# Patient Record
Sex: FEMALE | NOT HISPANIC OR LATINO | URBAN - METROPOLITAN AREA
[De-identification: names, ages, dates, MRNs, and addresses within clinical notes are randomized per-mention and may not be internally consistent; named-entity substitution may affect disease eponyms.]

---

## 2017-08-16 ENCOUNTER — IMPORTED ENCOUNTER (OUTPATIENT)
Dept: URBAN - METROPOLITAN AREA CLINIC 31 | Facility: CLINIC | Age: 72
End: 2017-08-16

## 2017-08-16 PROBLEM — H40.003: Noted: 2017-08-16

## 2017-08-16 PROBLEM — H04.123: Noted: 2017-08-16

## 2017-08-16 PROBLEM — Z96.1: Noted: 2017-08-16

## 2017-08-16 PROCEDURE — 92133 CPTRZD OPH DX IMG PST SGM ON: CPT

## 2017-08-16 PROCEDURE — 92014 COMPRE OPH EXAM EST PT 1/>: CPT

## 2017-08-16 PROCEDURE — 92015 DETERMINE REFRACTIVE STATE: CPT

## 2017-08-16 NOTE — PATIENT DISCUSSION
1.  Glaucoma suspect OU -  Fam hx MOM. Ocular Hypertension? ??  LVF 8/16 normal.  OCT 8/16/17 normal.  RNFL  74/80. No signs of glaucomatous damage to the optic nerve based on todays examination and testing. Will continue to monitor. 2. Pseudophakia OU - IOLs stable. Rezoom OD capsule clear Restor OS capsule open. Monitor. 3. Dry Eye OU:  Continue current management with Artificial Tears. 4.  Cont with OTC readers. 5.   RTN 1 yr CE/VF

## 2017-09-25 ENCOUNTER — APPOINTMENT (RX ONLY)
Dept: URBAN - METROPOLITAN AREA CLINIC 125 | Facility: CLINIC | Age: 72
Setting detail: DERMATOLOGY
End: 2017-09-25

## 2017-09-25 DIAGNOSIS — D18.0 HEMANGIOMA: ICD-10-CM

## 2017-09-25 DIAGNOSIS — L57.0 ACTINIC KERATOSIS: ICD-10-CM

## 2017-09-25 DIAGNOSIS — L60.3 NAIL DYSTROPHY: ICD-10-CM

## 2017-09-25 DIAGNOSIS — L82.1 OTHER SEBORRHEIC KERATOSIS: ICD-10-CM

## 2017-09-25 PROBLEM — D18.01 HEMANGIOMA OF SKIN AND SUBCUTANEOUS TISSUE: Status: ACTIVE | Noted: 2017-09-25

## 2017-09-25 PROCEDURE — ? PRESCRIPTION

## 2017-09-25 PROCEDURE — 99214 OFFICE O/P EST MOD 30 MIN: CPT

## 2017-09-25 RX ORDER — TRIAMCINOLONE ACETONIDE 0.1 %
OINTMENT (GRAM) TOPICAL
Qty: 1 | Refills: 1 | Status: ERX

## 2017-09-25 RX ORDER — FLUOROURACIL 50 MG/G
CREAM TOPICAL
Qty: 1 | Refills: 0 | Status: ERX

## 2017-09-25 ASSESSMENT — LOCATION ZONE DERM
LOCATION ZONE: TRUNK
LOCATION ZONE: TOE
LOCATION ZONE: NOSE
LOCATION ZONE: TRUNK

## 2017-09-25 ASSESSMENT — LOCATION DETAILED DESCRIPTION DERM
LOCATION DETAILED: LEFT MEDIAL BREAST 8-9:00 REGION
LOCATION DETAILED: NASAL SUPRATIP
LOCATION DETAILED: RIGHT SUPERIOR UPPER BACK
LOCATION DETAILED: LEFT DISTAL PLANTAR GREAT TOE
LOCATION DETAILED: LEFT LATERAL ABDOMEN

## 2017-09-25 ASSESSMENT — LOCATION SIMPLE DESCRIPTION DERM
LOCATION SIMPLE: NOSE
LOCATION SIMPLE: LEFT BREAST
LOCATION SIMPLE: LEFT GREAT TOE
LOCATION SIMPLE: RIGHT UPPER BACK
LOCATION SIMPLE: ABDOMEN

## 2017-11-27 ENCOUNTER — APPOINTMENT (RX ONLY)
Dept: URBAN - METROPOLITAN AREA CLINIC 125 | Facility: CLINIC | Age: 72
Setting detail: DERMATOLOGY
End: 2017-11-27

## 2017-11-27 DIAGNOSIS — T07XXXA INSECT BITE, NONVENOMOUS, OF OTHER, MULTIPLE, AND UNSPECIFIED SITES, WITHOUT MENTION OF INFECTION: ICD-10-CM

## 2017-11-27 DIAGNOSIS — Z09 ENCOUNTER FOR FOLLOW-UP EXAMINATION AFTER COMPLETED TREATMENT FOR CONDITIONS OTHER THAN MALIGNANT NEOPLASM: ICD-10-CM | Status: RESOLVED

## 2017-11-27 PROBLEM — L30.9 DERMATITIS, UNSPECIFIED: Status: ACTIVE | Noted: 2017-11-27

## 2017-11-27 PROBLEM — D48.5 NEOPLASM OF UNCERTAIN BEHAVIOR OF SKIN: Status: ACTIVE | Noted: 2017-11-27

## 2017-11-27 PROCEDURE — ? TREATMENT REGIMEN

## 2017-11-27 PROCEDURE — 99213 OFFICE O/P EST LOW 20 MIN: CPT

## 2017-11-27 PROCEDURE — ? COUNSELING

## 2017-11-27 ASSESSMENT — LOCATION SIMPLE DESCRIPTION DERM
LOCATION SIMPLE: LEFT ANKLE
LOCATION SIMPLE: NOSE
LOCATION SIMPLE: LEFT CALF

## 2017-11-27 ASSESSMENT — LOCATION DETAILED DESCRIPTION DERM
LOCATION DETAILED: NASAL SUPRATIP
LOCATION DETAILED: LEFT DISTAL CALF
LOCATION DETAILED: LEFT POSTERIOR ANKLE

## 2017-11-27 ASSESSMENT — LOCATION ZONE DERM
LOCATION ZONE: LEG
LOCATION ZONE: NOSE

## 2017-11-27 NOTE — PROCEDURE: TREATMENT REGIMEN
Detail Level: Simple
Initiate Treatment: Pt has Triamcinolone cream at home and is advised to apply to aa on back of leg BID x2 weeks
Plan: Pt to RTC PRN if aa persists

## 2018-07-11 ENCOUNTER — APPOINTMENT (RX ONLY)
Dept: URBAN - METROPOLITAN AREA CLINIC 125 | Facility: CLINIC | Age: 73
Setting detail: DERMATOLOGY
End: 2018-07-11

## 2018-07-11 DIAGNOSIS — D485 NEOPLASM OF UNCERTAIN BEHAVIOR OF SKIN: ICD-10-CM

## 2018-07-11 DIAGNOSIS — L81.4 OTHER MELANIN HYPERPIGMENTATION: ICD-10-CM

## 2018-07-11 PROBLEM — D48.5 NEOPLASM OF UNCERTAIN BEHAVIOR OF SKIN: Status: ACTIVE | Noted: 2018-07-11

## 2018-07-11 PROCEDURE — ? COUNSELING

## 2018-07-11 PROCEDURE — 11100: CPT

## 2018-07-11 PROCEDURE — 99213 OFFICE O/P EST LOW 20 MIN: CPT | Mod: 25

## 2018-07-11 PROCEDURE — ? BIOPSY BY SHAVE METHOD

## 2018-07-11 ASSESSMENT — LOCATION ZONE DERM
LOCATION ZONE: EAR
LOCATION ZONE: FACE

## 2018-07-11 ASSESSMENT — LOCATION SIMPLE DESCRIPTION DERM
LOCATION SIMPLE: LEFT CHEEK
LOCATION SIMPLE: LEFT EAR

## 2018-07-11 ASSESSMENT — LOCATION DETAILED DESCRIPTION DERM
LOCATION DETAILED: LEFT INFERIOR CENTRAL MALAR CHEEK
LOCATION DETAILED: LEFT INFERIOR HELIX

## 2018-07-11 NOTE — PROCEDURE: BIOPSY BY SHAVE METHOD
Curettage Text: The wound bed was treated with curettage after the biopsy was performed.
Detail Level: Detailed
Electrodesiccation Text: The wound bed was treated with electrodesiccation after the biopsy was performed.
Biopsy Type: H and E
Render Post-Care Instructions In Note?: yes
Silver Nitrate Text: The wound bed was treated with silver nitrate after the biopsy was performed.
Lab: [facility] [place]
Post-Care Instructions: I reviewed with the patient in detail post-care instructions. Patient is to keep the biopsy site dry overnight, and then apply bacitracin twice daily until healed. Patient may apply hydrogen peroxide soaks to remove any crusting.
Billing Type: [employer]
Notification Instructions: Patient will be notified of biopsy results. However, patient instructed to call the office if not contacted within 2 weeks.
Additional Anesthesia Volume In Cc (Will Not Render If 0): 0
Lab Facility: [id number]
Type Of Destruction Used: Electrodesiccation and Curettage
Depth Of Biopsy: dermis
Electrodesiccation And Curettage Text: The wound bed was treated with electrodesiccation and curettage after the biopsy was performed.
Dressing: bandage
Destruction After The Procedure: No
Anesthesia Type: 1% lidocaine with epinephrine
Wound Care: Bacitracin
Cryotherapy Text: The wound bed was treated with cryotherapy after the biopsy was performed.
Hemostasis: Drysol
Anesthesia Volume In Cc (Will Not Render If 0): 0.5
Size Of Lesion In Cm: 0.4
Consent: Written consent was obtained and risks were reviewed including but not limited to scarring, infection, bleeding, scabbing, incomplete removal, nerve damage and allergy to anesthesia.
Biopsy Method: Dermablade

## 2018-08-15 ENCOUNTER — IMPORTED ENCOUNTER (OUTPATIENT)
Dept: URBAN - METROPOLITAN AREA CLINIC 31 | Facility: CLINIC | Age: 73
End: 2018-08-15

## 2018-08-15 PROBLEM — H04.123: Noted: 2018-08-15

## 2018-08-15 PROBLEM — H40.003: Noted: 2018-08-15

## 2018-08-15 PROBLEM — Z96.1: Noted: 2018-08-15

## 2018-08-15 PROCEDURE — 92014 COMPRE OPH EXAM EST PT 1/>: CPT

## 2018-08-15 PROCEDURE — 92015 DETERMINE REFRACTIVE STATE: CPT

## 2018-08-15 PROCEDURE — 92083 EXTENDED VISUAL FIELD XM: CPT

## 2018-08-15 NOTE — PATIENT DISCUSSION
1.  Glaucoma suspect OU -  Fam hx MOM. Ocular Hypertension? ??  LVF 8/18 normal.  OCT 8/16/17 normal.  RNFL  74/80. No signs of glaucomatous damage to the optic nerve based on todays examination and testing. Will continue to monitor. 2. Pseudophakia OU - IOLs stable. Rezoom OD capsule clear Restor OS capsule open. Monitor. 3. Dry Eye OU:  Continue current management with Artificial Tears. 4.  Cont with OTC readers. 5.   RTN 1 yr CE/OCT

## 2018-12-04 ENCOUNTER — APPOINTMENT (RX ONLY)
Dept: URBAN - METROPOLITAN AREA CLINIC 125 | Facility: CLINIC | Age: 73
Setting detail: DERMATOLOGY
End: 2018-12-04

## 2018-12-04 DIAGNOSIS — D18.0 HEMANGIOMA: ICD-10-CM

## 2018-12-04 DIAGNOSIS — L57.0 ACTINIC KERATOSIS: ICD-10-CM

## 2018-12-04 DIAGNOSIS — L82.1 OTHER SEBORRHEIC KERATOSIS: ICD-10-CM

## 2018-12-04 DIAGNOSIS — L81.4 OTHER MELANIN HYPERPIGMENTATION: ICD-10-CM

## 2018-12-04 DIAGNOSIS — D22 MELANOCYTIC NEVI: ICD-10-CM

## 2018-12-04 DIAGNOSIS — Z71.89 OTHER SPECIFIED COUNSELING: ICD-10-CM

## 2018-12-04 PROBLEM — D22.5 MELANOCYTIC NEVI OF TRUNK: Status: ACTIVE | Noted: 2018-12-04

## 2018-12-04 PROBLEM — D18.01 HEMANGIOMA OF SKIN AND SUBCUTANEOUS TISSUE: Status: ACTIVE | Noted: 2018-12-04

## 2018-12-04 PROCEDURE — 99214 OFFICE O/P EST MOD 30 MIN: CPT | Mod: 25

## 2018-12-04 PROCEDURE — 17000 DESTRUCT PREMALG LESION: CPT

## 2018-12-04 PROCEDURE — ? LIQUID NITROGEN

## 2018-12-04 PROCEDURE — ? COUNSELING

## 2018-12-04 PROCEDURE — 17003 DESTRUCT PREMALG LES 2-14: CPT

## 2018-12-04 ASSESSMENT — LOCATION SIMPLE DESCRIPTION DERM
LOCATION SIMPLE: LEFT UPPER BACK
LOCATION SIMPLE: UPPER BACK
LOCATION SIMPLE: ABDOMEN
LOCATION SIMPLE: CHEST
LOCATION SIMPLE: LEFT HAND
LOCATION SIMPLE: RIGHT FOREARM
LOCATION SIMPLE: RIGHT HAND

## 2018-12-04 ASSESSMENT — LOCATION DETAILED DESCRIPTION DERM
LOCATION DETAILED: INFERIOR THORACIC SPINE
LOCATION DETAILED: LEFT RADIAL DORSAL HAND
LOCATION DETAILED: RIGHT DISTAL DORSAL FOREARM
LOCATION DETAILED: MIDDLE STERNUM
LOCATION DETAILED: RIGHT ULNAR DORSAL HAND
LOCATION DETAILED: LEFT INFERIOR UPPER BACK
LOCATION DETAILED: EPIGASTRIC SKIN

## 2018-12-04 ASSESSMENT — LOCATION ZONE DERM
LOCATION ZONE: TRUNK
LOCATION ZONE: HAND
LOCATION ZONE: ARM

## 2018-12-04 NOTE — PROCEDURE: LIQUID NITROGEN
Detail Level: Simple
Number Of Freeze-Thaw Cycles: 2 freeze-thaw cycles
Consent: The patient's consent was obtained including but not limited to risks of crusting, scabbing, blistering, scarring, darker or lighter pigmentary change, recurrence, incomplete removal and infection.
Post-Care Instructions: I reviewed with the patient in detail post-care instructions. Patient is to wear sunprotection, and avoid picking at any of the treated lesions. Pt may apply Vaseline to crusted or scabbing areas.
Render Post-Care Instructions In Note?: no
Duration Of Freeze Thaw-Cycle (Seconds): 0

## 2019-01-08 ENCOUNTER — APPOINTMENT (RX ONLY)
Dept: URBAN - METROPOLITAN AREA CLINIC 125 | Facility: CLINIC | Age: 74
Setting detail: DERMATOLOGY
End: 2019-01-08

## 2019-01-08 DIAGNOSIS — L57.0 ACTINIC KERATOSIS: ICD-10-CM

## 2019-01-08 DIAGNOSIS — L81.4 OTHER MELANIN HYPERPIGMENTATION: ICD-10-CM

## 2019-01-08 PROCEDURE — ? LIQUID NITROGEN

## 2019-01-08 PROCEDURE — 17000 DESTRUCT PREMALG LESION: CPT

## 2019-01-08 PROCEDURE — ? COUNSELING

## 2019-01-08 PROCEDURE — 17003 DESTRUCT PREMALG LES 2-14: CPT

## 2019-01-08 PROCEDURE — 99213 OFFICE O/P EST LOW 20 MIN: CPT | Mod: 25

## 2019-01-08 ASSESSMENT — LOCATION SIMPLE DESCRIPTION DERM
LOCATION SIMPLE: RIGHT HAND
LOCATION SIMPLE: LEFT HAND
LOCATION SIMPLE: LEFT CHEEK

## 2019-01-08 ASSESSMENT — LOCATION DETAILED DESCRIPTION DERM
LOCATION DETAILED: LEFT INFERIOR CENTRAL MALAR CHEEK
LOCATION DETAILED: RIGHT RADIAL DORSAL HAND
LOCATION DETAILED: LEFT ULNAR DORSAL HAND

## 2019-01-08 ASSESSMENT — LOCATION ZONE DERM
LOCATION ZONE: HAND
LOCATION ZONE: FACE

## 2019-01-08 NOTE — PROCEDURE: LIQUID NITROGEN
Render Post-Care Instructions In Note?: no
Detail Level: Simple
Consent: The patient's consent was obtained including but not limited to risks of crusting, scabbing, blistering, scarring, darker or lighter pigmentary change, recurrence, incomplete removal and infection.
Number Of Freeze-Thaw Cycles: 2 freeze-thaw cycles
Post-Care Instructions: I reviewed with the patient in detail post-care instructions. Patient is to wear sunprotection, and avoid picking at any of the treated lesions. Pt may apply Vaseline to crusted or scabbing areas.
Duration Of Freeze Thaw-Cycle (Seconds): 0

## 2019-08-20 ENCOUNTER — APPOINTMENT (RX ONLY)
Dept: URBAN - METROPOLITAN AREA CLINIC 125 | Facility: CLINIC | Age: 74
Setting detail: DERMATOLOGY
End: 2019-08-20

## 2019-08-20 DIAGNOSIS — L57.0 ACTINIC KERATOSIS: ICD-10-CM

## 2019-08-20 DIAGNOSIS — D22 MELANOCYTIC NEVI: ICD-10-CM

## 2019-08-20 DIAGNOSIS — L81.4 OTHER MELANIN HYPERPIGMENTATION: ICD-10-CM

## 2019-08-20 DIAGNOSIS — D18.0 HEMANGIOMA: ICD-10-CM

## 2019-08-20 DIAGNOSIS — L82.1 OTHER SEBORRHEIC KERATOSIS: ICD-10-CM

## 2019-08-20 DIAGNOSIS — Z71.89 OTHER SPECIFIED COUNSELING: ICD-10-CM

## 2019-08-20 PROBLEM — D18.01 HEMANGIOMA OF SKIN AND SUBCUTANEOUS TISSUE: Status: ACTIVE | Noted: 2019-08-20

## 2019-08-20 PROBLEM — D22.5 MELANOCYTIC NEVI OF TRUNK: Status: ACTIVE | Noted: 2019-08-20

## 2019-08-20 PROCEDURE — ? LIQUID NITROGEN

## 2019-08-20 PROCEDURE — ? COUNSELING

## 2019-08-20 PROCEDURE — 99214 OFFICE O/P EST MOD 30 MIN: CPT | Mod: 25

## 2019-08-20 PROCEDURE — 17003 DESTRUCT PREMALG LES 2-14: CPT

## 2019-08-20 PROCEDURE — 17000 DESTRUCT PREMALG LESION: CPT

## 2019-08-20 ASSESSMENT — LOCATION ZONE DERM
LOCATION ZONE: TRUNK
LOCATION ZONE: ARM

## 2019-08-20 ASSESSMENT — LOCATION SIMPLE DESCRIPTION DERM
LOCATION SIMPLE: LEFT FOREARM
LOCATION SIMPLE: CHEST
LOCATION SIMPLE: LEFT UPPER BACK
LOCATION SIMPLE: UPPER BACK
LOCATION SIMPLE: ABDOMEN
LOCATION SIMPLE: RIGHT FOREARM

## 2019-08-20 ASSESSMENT — LOCATION DETAILED DESCRIPTION DERM
LOCATION DETAILED: EPIGASTRIC SKIN
LOCATION DETAILED: LEFT INFERIOR UPPER BACK
LOCATION DETAILED: RIGHT DISTAL DORSAL FOREARM
LOCATION DETAILED: MIDDLE STERNUM
LOCATION DETAILED: RIGHT PROXIMAL DORSAL FOREARM
LOCATION DETAILED: LEFT DISTAL DORSAL FOREARM
LOCATION DETAILED: LEFT PROXIMAL DORSAL FOREARM
LOCATION DETAILED: RIGHT PROXIMAL RADIAL DORSAL FOREARM
LOCATION DETAILED: INFERIOR THORACIC SPINE

## 2019-08-20 ASSESSMENT — PAIN INTENSITY VAS: HOW INTENSE IS YOUR PAIN 0 BEING NO PAIN, 10 BEING THE MOST SEVERE PAIN POSSIBLE?: NO PAIN

## 2019-08-26 ENCOUNTER — IMPORTED ENCOUNTER (OUTPATIENT)
Dept: URBAN - METROPOLITAN AREA CLINIC 31 | Facility: CLINIC | Age: 74
End: 2019-08-26

## 2019-08-26 PROBLEM — Z96.1: Noted: 2019-08-26

## 2019-08-26 PROBLEM — H40.003: Noted: 2019-08-26

## 2019-08-26 PROBLEM — H04.123: Noted: 2019-08-26

## 2019-08-26 PROCEDURE — 92014 COMPRE OPH EXAM EST PT 1/>: CPT

## 2019-08-26 PROCEDURE — 92015 DETERMINE REFRACTIVE STATE: CPT

## 2019-08-26 PROCEDURE — 92133 CPTRZD OPH DX IMG PST SGM ON: CPT

## 2019-08-26 NOTE — PATIENT DISCUSSION
1.  Glaucoma suspect OU -  Fam hx MOM. Ocular Hypertension? ??  LVF 8/18 normal.  OCT 8/26/19 normal.  RNFL  74/80. No signs of glaucomatous damage to the optic nerve based on todays examination and testing. Will continue to monitor. 2. Pseudophakia OU - IOLs stable. Rezoom OD capsule clear Restor OS capsule open. Monitor. 3. Dry Eye OU:  Continue current management with Artificial Tears. 4.  Cont with OTC readers. 5.   RTN 1 yr CE/VF

## 2020-08-26 ENCOUNTER — APPOINTMENT (RX ONLY)
Dept: URBAN - METROPOLITAN AREA CLINIC 126 | Facility: CLINIC | Age: 75
Setting detail: DERMATOLOGY
End: 2020-08-26

## 2020-08-26 DIAGNOSIS — L72.8 OTHER FOLLICULAR CYSTS OF THE SKIN AND SUBCUTANEOUS TISSUE: ICD-10-CM

## 2020-08-26 DIAGNOSIS — D18.0 HEMANGIOMA: ICD-10-CM

## 2020-08-26 DIAGNOSIS — L57.0 ACTINIC KERATOSIS: ICD-10-CM

## 2020-08-26 DIAGNOSIS — D22 MELANOCYTIC NEVI: ICD-10-CM

## 2020-08-26 DIAGNOSIS — Z71.89 OTHER SPECIFIED COUNSELING: ICD-10-CM

## 2020-08-26 DIAGNOSIS — L82.1 OTHER SEBORRHEIC KERATOSIS: ICD-10-CM

## 2020-08-26 DIAGNOSIS — L81.4 OTHER MELANIN HYPERPIGMENTATION: ICD-10-CM

## 2020-08-26 PROBLEM — D22.5 MELANOCYTIC NEVI OF TRUNK: Status: ACTIVE | Noted: 2020-08-26

## 2020-08-26 PROBLEM — D18.01 HEMANGIOMA OF SKIN AND SUBCUTANEOUS TISSUE: Status: ACTIVE | Noted: 2020-08-26

## 2020-08-26 PROCEDURE — ? COUNSELING

## 2020-08-26 PROCEDURE — 17000 DESTRUCT PREMALG LESION: CPT

## 2020-08-26 PROCEDURE — 99214 OFFICE O/P EST MOD 30 MIN: CPT | Mod: 25

## 2020-08-26 PROCEDURE — ? LIQUID NITROGEN

## 2020-08-26 PROCEDURE — 17003 DESTRUCT PREMALG LES 2-14: CPT

## 2020-08-26 ASSESSMENT — LOCATION DETAILED DESCRIPTION DERM
LOCATION DETAILED: MIDDLE STERNUM
LOCATION DETAILED: LEFT DISTAL DORSAL FOREARM
LOCATION DETAILED: EPIGASTRIC SKIN
LOCATION DETAILED: LEFT INFERIOR UPPER BACK
LOCATION DETAILED: LEFT SUPERIOR MEDIAL LOWER BACK
LOCATION DETAILED: INFERIOR THORACIC SPINE

## 2020-08-26 ASSESSMENT — LOCATION SIMPLE DESCRIPTION DERM
LOCATION SIMPLE: CHEST
LOCATION SIMPLE: LEFT LOWER BACK
LOCATION SIMPLE: LEFT UPPER BACK
LOCATION SIMPLE: UPPER BACK
LOCATION SIMPLE: ABDOMEN
LOCATION SIMPLE: LEFT FOREARM

## 2020-08-26 ASSESSMENT — LOCATION ZONE DERM
LOCATION ZONE: TRUNK
LOCATION ZONE: ARM

## 2020-08-26 NOTE — PROCEDURE: LIQUID NITROGEN
Consent: The patient's consent was obtained including but not limited to risks of crusting, scabbing, blistering, scarring, darker or lighter pigmentary change, recurrence, incomplete removal and infection.
Render Note In Bullet Format When Appropriate: No
Post-Care Instructions: I reviewed with the patient in detail post-care instructions. Patient is to wear sunprotection, and avoid picking at any of the treated lesions. Pt may apply Vaseline to crusted or scabbing areas.
Detail Level: Simple
Number Of Freeze-Thaw Cycles: 3 freeze-thaw cycles
Render Post-Care Instructions In Note?: yes
Duration Of Freeze Thaw-Cycle (Seconds): 5

## 2020-08-31 ENCOUNTER — IMPORTED ENCOUNTER (OUTPATIENT)
Dept: URBAN - METROPOLITAN AREA CLINIC 31 | Facility: CLINIC | Age: 75
End: 2020-08-31

## 2020-08-31 PROBLEM — H40.003: Noted: 2020-08-31

## 2020-08-31 PROBLEM — H04.123: Noted: 2020-08-31

## 2020-08-31 PROBLEM — H40.013: Noted: 2020-08-31

## 2020-08-31 PROBLEM — Z96.1: Noted: 2020-08-31

## 2020-08-31 PROCEDURE — 92250 FUNDUS PHOTOGRAPHY W/I&R: CPT

## 2020-08-31 PROCEDURE — 92083 EXTENDED VISUAL FIELD XM: CPT

## 2020-08-31 PROCEDURE — 92015 DETERMINE REFRACTIVE STATE: CPT

## 2020-08-31 PROCEDURE — 92014 COMPRE OPH EXAM EST PT 1/>: CPT

## 2020-08-31 NOTE — PATIENT DISCUSSION
1.  Glaucoma suspect OU -  Fam hx MOM. Ocular Hypertension? ??  LVF 8/20 normal.  OCT 8/26/19 normal.  RNFL  74/80. No signs of glaucomatous damage to the optic nerve based on todays examination and testing. Will continue to monitor. 2. Pseudophakia OU - IOLs stable. Rezoom OD capsule open Restor OS capsule open. Monitor. 3. Dry Eye OU:  Continue current management with Artificial Tears. bid or more  4. Cont with OTC readers. 5.   RTN 1 yr CE/ OCT Nerve and optos

## 2021-08-30 ENCOUNTER — IMPORTED ENCOUNTER (OUTPATIENT)
Dept: URBAN - METROPOLITAN AREA CLINIC 31 | Facility: CLINIC | Age: 76
End: 2021-08-30

## 2021-08-30 PROBLEM — Z96.1: Noted: 2021-08-30

## 2021-08-30 PROBLEM — H04.123: Noted: 2021-08-30

## 2021-08-30 PROBLEM — H40.003: Noted: 2021-08-30

## 2021-08-30 PROBLEM — H40.013: Noted: 2021-08-30

## 2021-08-30 PROCEDURE — 92250 FUNDUS PHOTOGRAPHY W/I&R: CPT

## 2021-08-30 PROCEDURE — 92133 CPTRZD OPH DX IMG PST SGM ON: CPT

## 2021-08-30 PROCEDURE — 92014 COMPRE OPH EXAM EST PT 1/>: CPT

## 2021-08-30 PROCEDURE — 92015 DETERMINE REFRACTIVE STATE: CPT

## 2021-08-30 NOTE — PATIENT DISCUSSION
1.  Glaucoma suspect OU -  Fam hx MOM. Ocular Hypertension? ??  LVF 8/20 normal.  OCT 8/30/21  normal.  RNFL  82/101 from 74/80. No signs of glaucomatous damage to the optic nerve based on todays examination and testing. Will continue to monitor. 2. Pseudophakia OU - IOLs stable. Rezoom/Restor. capsule open OU. Monitor. 3. Dry Eye OU:  Start using tears qid and gels qhs ou.    4.  Cont with OTC readers. 5.   RTN 1 yr CE/ HVF 24/-2 and optos

## 2021-09-01 ENCOUNTER — APPOINTMENT (RX ONLY)
Dept: URBAN - METROPOLITAN AREA CLINIC 126 | Facility: CLINIC | Age: 76
Setting detail: DERMATOLOGY
End: 2021-09-01

## 2021-09-01 DIAGNOSIS — D22 MELANOCYTIC NEVI: ICD-10-CM

## 2021-09-01 DIAGNOSIS — D18.0 HEMANGIOMA: ICD-10-CM

## 2021-09-01 DIAGNOSIS — Z71.89 OTHER SPECIFIED COUNSELING: ICD-10-CM

## 2021-09-01 DIAGNOSIS — L82.1 OTHER SEBORRHEIC KERATOSIS: ICD-10-CM

## 2021-09-01 DIAGNOSIS — L81.4 OTHER MELANIN HYPERPIGMENTATION: ICD-10-CM

## 2021-09-01 PROBLEM — D22.5 MELANOCYTIC NEVI OF TRUNK: Status: ACTIVE | Noted: 2021-09-01

## 2021-09-01 PROBLEM — D18.01 HEMANGIOMA OF SKIN AND SUBCUTANEOUS TISSUE: Status: ACTIVE | Noted: 2021-09-01

## 2021-09-01 PROCEDURE — ? SUNSCREEN RECOMMENDATIONS

## 2021-09-01 PROCEDURE — ? COUNSELING

## 2021-09-01 PROCEDURE — 99213 OFFICE O/P EST LOW 20 MIN: CPT

## 2021-09-01 ASSESSMENT — LOCATION DETAILED DESCRIPTION DERM
LOCATION DETAILED: EPIGASTRIC SKIN
LOCATION DETAILED: INFERIOR THORACIC SPINE
LOCATION DETAILED: LEFT INFERIOR UPPER BACK
LOCATION DETAILED: MIDDLE STERNUM

## 2021-09-01 ASSESSMENT — LOCATION SIMPLE DESCRIPTION DERM
LOCATION SIMPLE: ABDOMEN
LOCATION SIMPLE: LEFT UPPER BACK
LOCATION SIMPLE: UPPER BACK
LOCATION SIMPLE: CHEST

## 2021-09-01 ASSESSMENT — LOCATION ZONE DERM: LOCATION ZONE: TRUNK

## 2022-04-02 ASSESSMENT — TONOMETRY
OD_IOP_MMHG: 20
OS_IOP_MMHG: 20
OD_IOP_MMHG: 20
OS_IOP_MMHG: 20
OD_IOP_MMHG: 19
OS_IOP_MMHG: 19
OD_IOP_MMHG: 20
OS_IOP_MMHG: 19
OS_IOP_MMHG: 20
OD_IOP_MMHG: 20

## 2022-04-02 ASSESSMENT — VISUAL ACUITY
OS_SC: 20/200
OS_CC: 20/40+2
OS_CC: 20/50
OD_SC: 20/70
OD_CC: 20/25
OS_CC: 20/40-1
OS_SC: 20/50
OS_CC: 20/40
OS_SC: 20/40
OD_CC: 20/25
OD_CC: 20/25-1
OS_CC: 20/40-2
OD_SC: 20/200
OD_CC: 20/25-2
OD_CC: 20/20-1
OD_SC: 20/100

## 2022-06-04 ENCOUNTER — TELEPHONE ENCOUNTER (OUTPATIENT)
Dept: URBAN - METROPOLITAN AREA CLINIC 68 | Facility: CLINIC | Age: 77
End: 2022-06-04

## 2022-06-05 ENCOUNTER — TELEPHONE ENCOUNTER (OUTPATIENT)
Dept: URBAN - METROPOLITAN AREA CLINIC 68 | Facility: CLINIC | Age: 77
End: 2022-06-05

## 2022-06-25 ENCOUNTER — TELEPHONE ENCOUNTER (OUTPATIENT)
Age: 77
End: 2022-06-25

## 2022-06-26 ENCOUNTER — TELEPHONE ENCOUNTER (OUTPATIENT)
Age: 77
End: 2022-06-26

## 2022-08-29 ENCOUNTER — COMPREHENSIVE EXAM (OUTPATIENT)
Dept: URBAN - METROPOLITAN AREA CLINIC 34 | Facility: CLINIC | Age: 77
End: 2022-08-29

## 2022-08-29 DIAGNOSIS — Z96.1: ICD-10-CM

## 2022-08-29 DIAGNOSIS — H40.013: ICD-10-CM

## 2022-08-29 PROCEDURE — 92015 DETERMINE REFRACTIVE STATE: CPT

## 2022-08-29 PROCEDURE — 92083 EXTENDED VISUAL FIELD XM: CPT

## 2022-08-29 PROCEDURE — 92014 COMPRE OPH EXAM EST PT 1/>: CPT

## 2022-08-29 PROCEDURE — 92250 FUNDUS PHOTOGRAPHY W/I&R: CPT

## 2022-08-29 ASSESSMENT — VISUAL ACUITY
OU_SC: 20/20-2
OU_CC: 20/20-2

## 2022-08-29 ASSESSMENT — TONOMETRY
OS_IOP_MMHG: 22
OD_IOP_MMHG: 22

## 2022-08-29 NOTE — PATIENT DISCUSSION
Fam hx MOM. Ocular Hypertension? ??  OCT 8/30/21 normal. RNFL 82/101 from 74/80. No signs of glaucomatous damage to the optic nerve based on todays examination and testing. Will continue to monitor.

## 2022-08-31 ENCOUNTER — APPOINTMENT (RX ONLY)
Dept: URBAN - METROPOLITAN AREA CLINIC 126 | Facility: CLINIC | Age: 77
Setting detail: DERMATOLOGY
End: 2022-08-31

## 2022-08-31 DIAGNOSIS — L82.1 OTHER SEBORRHEIC KERATOSIS: ICD-10-CM

## 2022-08-31 DIAGNOSIS — L72.0 EPIDERMAL CYST: ICD-10-CM

## 2022-08-31 DIAGNOSIS — D18.0 HEMANGIOMA: ICD-10-CM

## 2022-08-31 DIAGNOSIS — L82.0 INFLAMED SEBORRHEIC KERATOSIS: ICD-10-CM

## 2022-08-31 DIAGNOSIS — D22 MELANOCYTIC NEVI: ICD-10-CM

## 2022-08-31 DIAGNOSIS — L81.4 OTHER MELANIN HYPERPIGMENTATION: ICD-10-CM

## 2022-08-31 DIAGNOSIS — Z71.89 OTHER SPECIFIED COUNSELING: ICD-10-CM

## 2022-08-31 PROBLEM — D22.5 MELANOCYTIC NEVI OF TRUNK: Status: ACTIVE | Noted: 2022-08-31

## 2022-08-31 PROBLEM — D18.01 HEMANGIOMA OF SKIN AND SUBCUTANEOUS TISSUE: Status: ACTIVE | Noted: 2022-08-31

## 2022-08-31 PROCEDURE — 99213 OFFICE O/P EST LOW 20 MIN: CPT | Mod: 25

## 2022-08-31 PROCEDURE — ? COUNSELING

## 2022-08-31 PROCEDURE — ? BENIGN DESTRUCTION

## 2022-08-31 PROCEDURE — ? SUNSCREEN RECOMMENDATIONS

## 2022-08-31 PROCEDURE — 17110 DESTRUCTION B9 LES UP TO 14: CPT

## 2022-08-31 ASSESSMENT — LOCATION SIMPLE DESCRIPTION DERM
LOCATION SIMPLE: LEFT UPPER BACK
LOCATION SIMPLE: ABDOMEN
LOCATION SIMPLE: UPPER BACK
LOCATION SIMPLE: RIGHT FOREARM
LOCATION SIMPLE: CHEST
LOCATION SIMPLE: LEFT LOWER BACK

## 2022-08-31 ASSESSMENT — LOCATION DETAILED DESCRIPTION DERM
LOCATION DETAILED: LEFT SUPERIOR MEDIAL LOWER BACK
LOCATION DETAILED: INFERIOR THORACIC SPINE
LOCATION DETAILED: RIGHT PROXIMAL DORSAL FOREARM
LOCATION DETAILED: MIDDLE STERNUM
LOCATION DETAILED: LEFT INFERIOR UPPER BACK
LOCATION DETAILED: EPIGASTRIC SKIN

## 2022-08-31 ASSESSMENT — LOCATION ZONE DERM
LOCATION ZONE: ARM
LOCATION ZONE: TRUNK

## 2022-08-31 NOTE — PROCEDURE: BENIGN DESTRUCTION
Medical Necessity Clause: This procedure was medically necessary because the lesions that were treated were:
Render Note In Bullet Format When Appropriate: No
Anesthesia Volume In Cc: 0
Consent: The patient's consent was obtained including but not limited to risks of crusting, scabbing, blistering, scarring, darker or lighter pigmentary change, recurrence, incomplete removal and infection.
Post-Care Instructions: I reviewed with the patient in detail post-care instructions. Patient is to wear sunprotection, and avoid picking at any of the treated lesions. Pt may apply Vaseline to crusted or scabbing areas.
Medical Necessity Information: It is in your best interest to select a reason for this procedure from the list below. All of these items fulfill various CMS LCD requirements except the new and changing color options.
Render Post-Care Instructions In Note?: yes
Detail Level: Detailed

## 2023-08-28 ENCOUNTER — COMPREHENSIVE EXAM (OUTPATIENT)
Dept: URBAN - METROPOLITAN AREA CLINIC 34 | Facility: CLINIC | Age: 78
End: 2023-08-28

## 2023-08-28 DIAGNOSIS — H40.013: ICD-10-CM

## 2023-08-28 DIAGNOSIS — Z96.1: ICD-10-CM

## 2023-08-28 PROCEDURE — 92083 EXTENDED VISUAL FIELD XM: CPT

## 2023-08-28 PROCEDURE — 92015 DETERMINE REFRACTIVE STATE: CPT

## 2023-08-28 PROCEDURE — 92014 COMPRE OPH EXAM EST PT 1/>: CPT

## 2023-08-28 PROCEDURE — 92133 CPTRZD OPH DX IMG PST SGM ON: CPT

## 2023-08-28 ASSESSMENT — VISUAL ACUITY
OS_PH: 20/30-1
OU_OTHER: +2.25
OU_CC: J1+
OS_SC: 20/40
OD_SC: 20/20

## 2023-08-28 ASSESSMENT — TONOMETRY
OS_IOP_MMHG: 19
OD_IOP_MMHG: 22

## 2023-08-31 ENCOUNTER — APPOINTMENT (RX ONLY)
Dept: URBAN - METROPOLITAN AREA CLINIC 126 | Facility: CLINIC | Age: 78
Setting detail: DERMATOLOGY
End: 2023-08-31

## 2023-08-31 DIAGNOSIS — Z71.89 OTHER SPECIFIED COUNSELING: ICD-10-CM

## 2023-08-31 DIAGNOSIS — L82.1 OTHER SEBORRHEIC KERATOSIS: ICD-10-CM

## 2023-08-31 DIAGNOSIS — D22 MELANOCYTIC NEVI: ICD-10-CM

## 2023-08-31 DIAGNOSIS — D18.0 HEMANGIOMA: ICD-10-CM

## 2023-08-31 DIAGNOSIS — L81.4 OTHER MELANIN HYPERPIGMENTATION: ICD-10-CM

## 2023-08-31 DIAGNOSIS — L82.0 INFLAMED SEBORRHEIC KERATOSIS: ICD-10-CM

## 2023-08-31 PROBLEM — D18.01 HEMANGIOMA OF SKIN AND SUBCUTANEOUS TISSUE: Status: ACTIVE | Noted: 2023-08-31

## 2023-08-31 PROBLEM — D22.5 MELANOCYTIC NEVI OF TRUNK: Status: ACTIVE | Noted: 2023-08-31

## 2023-08-31 PROCEDURE — ? COUNSELING

## 2023-08-31 PROCEDURE — 17110 DESTRUCTION B9 LES UP TO 14: CPT

## 2023-08-31 PROCEDURE — 99213 OFFICE O/P EST LOW 20 MIN: CPT | Mod: 25

## 2023-08-31 PROCEDURE — ? SUNSCREEN RECOMMENDATIONS

## 2023-08-31 PROCEDURE — ? BENIGN DESTRUCTION

## 2023-08-31 ASSESSMENT — LOCATION SIMPLE DESCRIPTION DERM
LOCATION SIMPLE: CHEST
LOCATION SIMPLE: LEFT UPPER BACK
LOCATION SIMPLE: ABDOMEN
LOCATION SIMPLE: UPPER BACK

## 2023-08-31 ASSESSMENT — LOCATION DETAILED DESCRIPTION DERM
LOCATION DETAILED: LEFT INFERIOR UPPER BACK
LOCATION DETAILED: INFERIOR THORACIC SPINE
LOCATION DETAILED: EPIGASTRIC SKIN
LOCATION DETAILED: LEFT SUPERIOR UPPER BACK
LOCATION DETAILED: MIDDLE STERNUM
LOCATION DETAILED: RIGHT LATERAL SUPERIOR CHEST

## 2023-08-31 ASSESSMENT — LOCATION ZONE DERM: LOCATION ZONE: TRUNK

## 2023-08-31 NOTE — PROCEDURE: BENIGN DESTRUCTION
Render Post-Care Instructions In Note?: yes
Medical Necessity Information: It is in your best interest to select a reason for this procedure from the list below. All of these items fulfill various CMS LCD requirements except the new and changing color options.
Treatment Number (Will Not Render If 0): 0
Medical Necessity Clause: This procedure was medically necessary because the lesions that were treated were:
Post-Care Instructions: I reviewed with the patient in detail post-care instructions. Patient is to wear sunprotection, and avoid picking at any of the treated lesions. Pt may apply Vaseline to crusted or scabbing areas.
Consent: The patient's consent was obtained including but not limited to risks of crusting, scabbing, blistering, scarring, darker or lighter pigmentary change, recurrence, incomplete removal and infection.
Add 52 Modifier (Optional): no
Detail Level: Detailed

## 2024-08-08 ENCOUNTER — FOLLOW UP (OUTPATIENT)
Dept: URBAN - METROPOLITAN AREA CLINIC 34 | Facility: CLINIC | Age: 79
End: 2024-08-08

## 2024-08-08 DIAGNOSIS — T26.10XA: ICD-10-CM

## 2024-08-08 DIAGNOSIS — H57.89: ICD-10-CM

## 2024-08-08 PROCEDURE — 99214 OFFICE O/P EST MOD 30 MIN: CPT

## 2024-08-08 RX ORDER — NEOMYCIN SULFATE, POLYMYXIN B SULFATE AND DEXAMETHASONE 3.5; 10000; 1 MG/ML; [USP'U]/ML; MG/ML
1 SUSPENSION OPHTHALMIC
Start: 2024-08-08 | End: 2024-08-15

## 2024-08-08 ASSESSMENT — TONOMETRY
OS_IOP_MMHG: 18
OD_IOP_MMHG: 20

## 2024-08-08 ASSESSMENT — VISUAL ACUITY
OD_SC: 20/20
OS_SC: 20/40+2

## 2024-08-12 ENCOUNTER — FOLLOW UP (OUTPATIENT)
Dept: URBAN - METROPOLITAN AREA CLINIC 34 | Facility: CLINIC | Age: 79
End: 2024-08-12

## 2024-08-12 DIAGNOSIS — T26.12XD: ICD-10-CM

## 2024-08-12 PROCEDURE — 99212 OFFICE O/P EST SF 10 MIN: CPT

## 2024-08-12 ASSESSMENT — VISUAL ACUITY
OS_SC: 20/40-1
OD_SC: 20/20

## 2024-09-09 ENCOUNTER — COMPREHENSIVE EXAM (OUTPATIENT)
Dept: URBAN - METROPOLITAN AREA CLINIC 34 | Facility: CLINIC | Age: 79
End: 2024-09-09

## 2024-09-09 DIAGNOSIS — H04.123: ICD-10-CM

## 2024-09-09 DIAGNOSIS — Z96.1: ICD-10-CM

## 2024-09-09 DIAGNOSIS — H40.013: ICD-10-CM

## 2024-09-09 PROCEDURE — 92014 COMPRE OPH EXAM EST PT 1/>: CPT

## 2024-09-09 PROCEDURE — 92015 DETERMINE REFRACTIVE STATE: CPT

## 2024-09-09 PROCEDURE — 92250 FUNDUS PHOTOGRAPHY W/I&R: CPT

## 2024-09-19 ENCOUNTER — APPOINTMENT (RX ONLY)
Dept: URBAN - METROPOLITAN AREA CLINIC 126 | Facility: CLINIC | Age: 79
Setting detail: DERMATOLOGY
End: 2024-09-19

## 2024-09-19 DIAGNOSIS — L72.0 EPIDERMAL CYST: ICD-10-CM

## 2024-09-19 DIAGNOSIS — D22 MELANOCYTIC NEVI: ICD-10-CM

## 2024-09-19 DIAGNOSIS — L81.4 OTHER MELANIN HYPERPIGMENTATION: ICD-10-CM

## 2024-09-19 DIAGNOSIS — D18.0 HEMANGIOMA: ICD-10-CM

## 2024-09-19 DIAGNOSIS — L82.1 OTHER SEBORRHEIC KERATOSIS: ICD-10-CM

## 2024-09-19 DIAGNOSIS — Z71.89 OTHER SPECIFIED COUNSELING: ICD-10-CM

## 2024-09-19 DIAGNOSIS — L82.0 INFLAMED SEBORRHEIC KERATOSIS: ICD-10-CM

## 2024-09-19 PROBLEM — D22.5 MELANOCYTIC NEVI OF TRUNK: Status: ACTIVE | Noted: 2024-09-19

## 2024-09-19 PROBLEM — D18.01 HEMANGIOMA OF SKIN AND SUBCUTANEOUS TISSUE: Status: ACTIVE | Noted: 2024-09-19

## 2024-09-19 PROCEDURE — 17110 DESTRUCTION B9 LES UP TO 14: CPT

## 2024-09-19 PROCEDURE — 99213 OFFICE O/P EST LOW 20 MIN: CPT | Mod: 25

## 2024-09-19 PROCEDURE — ? SUNSCREEN RECOMMENDATIONS

## 2024-09-19 PROCEDURE — ? LIQUID NITROGEN

## 2024-09-19 PROCEDURE — ? COUNSELING

## 2024-09-19 ASSESSMENT — LOCATION DETAILED DESCRIPTION DERM
LOCATION DETAILED: LEFT SUPERIOR UPPER BACK
LOCATION DETAILED: LEFT INFERIOR UPPER BACK
LOCATION DETAILED: INFERIOR THORACIC SPINE
LOCATION DETAILED: LEFT SUPERIOR MEDIAL LOWER BACK
LOCATION DETAILED: EPIGASTRIC SKIN
LOCATION DETAILED: MIDDLE STERNUM

## 2024-09-19 ASSESSMENT — LOCATION SIMPLE DESCRIPTION DERM
LOCATION SIMPLE: ABDOMEN
LOCATION SIMPLE: UPPER BACK
LOCATION SIMPLE: CHEST
LOCATION SIMPLE: LEFT LOWER BACK
LOCATION SIMPLE: LEFT UPPER BACK

## 2024-09-19 ASSESSMENT — LOCATION ZONE DERM: LOCATION ZONE: TRUNK

## 2024-09-19 NOTE — PROCEDURE: LIQUID NITROGEN
Application Tool (Optional): Liquid Nitrogen Sprayer
Spray Paint Technique: No
Show Topical Anesthesia Variable?: Yes
Medical Necessity Information: It is in your best interest to select a reason for this procedure from the list below. All of these items fulfill various CMS LCD requirements except the new and changing color options.
Aperture Size (Optional): B
Number Of Freeze-Thaw Cycles: 1 freeze-thaw cycle
Detail Level: Detailed
Post-Care Instructions: I reviewed with the patient in detail post-care instructions. Patient is to wear sunprotection, and avoid picking at any of the treated lesions. Pt may apply Vaseline to crusted or scabbing areas.
Medical Necessity Clause: This procedure was medically necessary because the lesions that were treated were:
Consent: The patient's consent was obtained including but not limited to risks of crusting, scabbing, blistering, scarring, darker or lighter pigmentary change, recurrence, incomplete removal and infection.
Duration Of Freeze Thaw-Cycle (Seconds): 3
Spray Paint Text: The liquid nitrogen was applied to the skin utilizing a spray paint frosting technique.

## 2024-09-19 NOTE — PROCEDURE: SUNSCREEN RECOMMENDATIONS
Products Recommended: Alastin Hydratint, Elta MD, and MD solar science mineral sunscreen.
General Sunscreen Counseling: I recommended a broad spectrum sunscreen with a SPF of 30 or higher.  I discussed my preference for Physical blocking/mineral sunblocks including zinc oxide or titanium dioxide. I explained that SPF 30 sunscreens block approximately 97 percent of the sun's harmful rays.  Sunscreens should be applied at least 15 minutes prior to expected sun exposure and then every 2 hours after that as long as sun exposure continues. If swimming or exercising sunscreen should be reapplied every 45 minutes to an hour after getting wet or sweating.  One ounce, or the equivalent of a shot glass full of sunscreen, is adequate to protect the skin not covered by a bathing suit. I also recommended a lip balm with a sunscreen as well. Sun protective clothing can be used in lieu of sunscreen but must be worn the entire time you are exposed to the sun's rays.
Detail Level: Generalized